# Patient Record
(demographics unavailable — no encounter records)

---

## 2025-05-22 NOTE — PHYSICAL EXAM
[NI] : Normal [de-identified] : bilateral palpable, firm breast capsules, tender on palpation no palpable breast mass/ lymph nodes no nipple retraction/ skin ulceration

## 2025-05-22 NOTE — PLAN
[TextEntry] : We will plan to perform the following procedures to be performed in the OR under general anesthesia: 19371-50 total capsulectomy 19330-50 removal of ruptured breast implant   Photo consents were signed and photos were taken.   Patient will return before surgery for pre-surgery visit as needed   The entire office visit was conducted in the presence of a female chaperone/ NP.   I spent 60 minutes reviewing the patient's chart, labs, imaging, interviewing and examining patient, and discussing plan of care with the patient, PA team, and other providers, excluding separately billable procedures.

## 2025-05-22 NOTE — HISTORY OF PRESENT ILLNESS
[FreeTextEntry1] : The patient is a 72 year old female that presents with concerns about her breasts. Her medical/ surgical history is significant for bl breast implants ~40 years ago in [place]. She started experiencing pain in both breasts ~ 2 years that has progressively gone worse.   MRI breast ([facility]) on 12/18/2024 showed b/l implant rupture; bilateral pockets of silicone, which may represent extracapsular rupture contained in the retropectoral region; exam nondiagnostic for evaluation of malignancy    She admits tobacco use (5-6 cigarettes/day), but no aspirin/ anticoagulant use. [facility] lives in senior living facility. She would like to have the implants removed and not replaced, She presents for consultation.

## 2025-05-22 NOTE — ASSESSMENT
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  Based on the history and physical, patient is a surgical candidate for removal of b/l ruptured silicone breast implants, and possible partial/ total capsulectomy, which is medically indicated.   Patient was counseled on the details of the surgery and post-op recovery, as well as the risks, benefits, alternatives (i.e. no surgery) as detailed in [facility] consent, including (but not limited to): skin wrinkling and rippling, delayed healing and tissue necrosis, change in nipple and skin sensation, breast disease, healing issues, infection, bleeding, scarring (hypertrophic scars/ keloids), firmness, skin contour irregularities, skin discoloration/ swelling, skin sensitivity, major wound separation, exposed sutures, delayed healing, damage to deeper structures, fat necrosis, seroma, risks associated with surgical anesthesia, shock, pain, cardiopulmonary complications, venous thrombosis and sequelae, allergic reactions, drug reactions, asymmetry, persistent swelling (lymphedema), unsatisfactory result, need for revision/ additional procedures, and patient would like to proceed,

## 2025-05-22 NOTE — REASON FOR VISIT
[Initial Evaluation] : an initial evaluation [Other: _____] : [unfilled] [FreeTextEntry1] : b/ ruptured silicone breast implants

## 2025-06-26 NOTE — REASON FOR VISIT
[Post Op: _________] : a [unfilled] post op visit [Family Member] : family member [FreeTextEntry1] : s/p removal of b/l ruptured silicone implants + total capsulectomy

## 2025-06-26 NOTE — HISTORY OF PRESENT ILLNESS
[FreeTextEntry1] : Patient returns for follow up; she is POD #8 s/p removal of b/l ruptured silicone implants + total capsulectomy  right ZAINAB output >20ml/day left ZAINAB output <10ml/day x 3 days

## 2025-06-26 NOTE — PLAN
[TextEntry] : continue ZAINAB monitoring and care  continue wearing binder continue oral antibiotics  follow up in a week

## 2025-07-03 NOTE — HISTORY OF PRESENT ILLNESS
[FreeTextEntry1] : Patient returns for follow up; she is POD #15 s/p removal of b/l ruptured silicone implants + total capsulectomy  right ZAINAB output >20ml/day

## 2025-07-11 NOTE — PLAN
[TextEntry] : continue ZAINAB monitoring and care continue wearing binder continue oral antibiotics avoid strenuous activities/ heavy lifting   follow up in a week

## 2025-07-11 NOTE — HISTORY OF PRESENT ILLNESS
[FreeTextEntry1] : Patient returns for follow up; she is POD #22 s/p removal of b/l ruptured silicone implants + total capsulectomy  right ZAINAB output >20ml/day

## 2025-07-17 NOTE — PLAN
[TextEntry] : continue ZAINAB monitoring and care continue wearing binder avoid strenuous activities/ heavy lifting  follow up in a week

## 2025-07-17 NOTE — REASON FOR VISIT
[Post Op: _________] : a [unfilled] post op visit [FreeTextEntry1] :  s/p removal of b/l ruptured silicone implants + total capsulectomy

## 2025-07-17 NOTE — HISTORY OF PRESENT ILLNESS
[FreeTextEntry1] : Patient returns for follow up; she is POD #29 s/p removal of b/l ruptured silicone implants + total capsulectomy  right ZAINAB output >20ml/day

## 2025-07-24 NOTE — HISTORY OF PRESENT ILLNESS
[FreeTextEntry1] : Patient returns for follow up; she is POD #36 s/p removal of b/l ruptured silicone implants + total capsulectomy  right ZAINAB output still >20ml/day  Patient continues to smoke 3-4 cigarettes daily